# Patient Record
Sex: MALE | Race: WHITE | NOT HISPANIC OR LATINO | Employment: FULL TIME | URBAN - METROPOLITAN AREA
[De-identification: names, ages, dates, MRNs, and addresses within clinical notes are randomized per-mention and may not be internally consistent; named-entity substitution may affect disease eponyms.]

---

## 2019-03-14 ENCOUNTER — OFFICE VISIT (OUTPATIENT)
Dept: FAMILY MEDICINE | Facility: CLINIC | Age: 44
End: 2019-03-14
Payer: COMMERCIAL

## 2019-03-14 VITALS
OXYGEN SATURATION: 97 % | RESPIRATION RATE: 16 BRPM | WEIGHT: 254.75 LBS | BODY MASS INDEX: 33.76 KG/M2 | SYSTOLIC BLOOD PRESSURE: 130 MMHG | HEIGHT: 73 IN | TEMPERATURE: 97.9 F | DIASTOLIC BLOOD PRESSURE: 76 MMHG | HEART RATE: 67 BPM

## 2019-03-14 DIAGNOSIS — Z72.51 HIGH RISK SEXUAL BEHAVIOR, UNSPECIFIED TYPE: ICD-10-CM

## 2019-03-14 DIAGNOSIS — B07.0 PLANTAR WARTS: ICD-10-CM

## 2019-03-14 DIAGNOSIS — G56.23 ULNAR NEUROPATHY OF BOTH UPPER EXTREMITIES: Primary | ICD-10-CM

## 2019-03-14 SDOH — HEALTH STABILITY: MENTAL HEALTH: HOW OFTEN DO YOU HAVE A DRINK CONTAINING ALCOHOL?: 2-3 TIMES A WEEK

## 2019-03-14 SDOH — HEALTH STABILITY: MENTAL HEALTH: HOW OFTEN DO YOU HAVE 6 OR MORE DRINKS ON ONE OCCASION?: MONTHLY

## 2019-03-14 SDOH — HEALTH STABILITY: MENTAL HEALTH: HOW MANY STANDARD DRINKS CONTAINING ALCOHOL DO YOU HAVE ON A TYPICAL DAY?: 1 OR 2

## 2019-03-14 ASSESSMENT — MIFFLIN-ST. JEOR: SCORE: 2099.42

## 2019-03-14 NOTE — PROGRESS NOTES
"  SUBJECTIVE:   Fede Hall is a 43 year old male who presents to clinic today to establish care and to discuss the following problem(s).    He is re-establishing care. Last visit was in 07/2014.    # Paresthesias in Upper Extremities  - noticed first when doing plank during yoga  - pain in left wrist  - started doing wrist exercises and pain improved    - then started noticing problem when he would wake from sleep with \"numbness\" in hands, like it has fallen asleep  - tingling sensationa  - can be both hands, indicates ulnar half of palm and 4th and 5th fingers as area of tingling, can also be in ulnar side of forearms  - went to chiropractor in December and tried acupuncture, not a lot of improvement  - symptoms almost every night  - sometimes happens also when typing  - also sometimes pain in base of thumb of left hand  - no weakness     # Wart  - has been a recurrent issue  - this one present for 1 month on left foot  - has tried OTC liquid treatment  - has been painful    # At risk HIV  - >5 male partners in past 6 months  - condoms usually but not always  - history of rectal GC in December  - mix of receptive and penetrative anal sex    ROS: Denies fevers, chills, chest pain, difficulty breathing, abdominal pain    History reviewed. No pertinent past medical history.  Past Surgical History:   Procedure Laterality Date     TONSILLECTOMY       Family History   Problem Relation Age of Onset     Thyroid Disease Mother         thyroid cancer     Pancreatic Cancer Maternal Grandmother 86     Colon Cancer Maternal Grandfather 66     Heart Disease No family hx of      Prostate Cancer No family hx of      Social History     Tobacco Use     Smoking status: Never Smoker     Smokeless tobacco: Never Used   Substance Use Topics     Alcohol use: Yes     Frequency: 2-3 times a week     Drinks per session: 1 or 2     Binge frequency: Monthly     Drug use: Yes     Types: Marijuana     Comment: rarely MJ     Social " "History     Social History Narrative        Also in [employer] for     Lives alone       Current Outpatient Medications   Medication     Multiple Vitamin (MULTI-DAY VITAMINS PO)     No current facility-administered medications for this visit.      I have reviewed the patient's past medical, surgical, family, and social history.     OBJECTIVE:   /76 (BP Location: Right arm, Patient Position: Sitting, Cuff Size: Adult Large)   Pulse 67   Temp 97.9  F (36.6  C) (Oral)   Resp 16   Ht 1.854 m (6' 1\")   Wt 115.6 kg (254 lb 12 oz)   SpO2 97%   BMI 33.61 kg/m      Constitutional: well-appearing, appears stated age  Eyes: conjunctivae without erythema, sclera anicteric.   Cardiac: regular rate and rhythm, normal S1/S2, no murmur/rubs/gallops  Respiratory: lungs clear to auscultation bilaterally, normal work of breathing, no wheezes/crackles  Skin: approx 0.5cm slightly raised firm papule with dark purple center on sole of left foot  Psych: affect is full and appropriate, speech is fluent and non-pressured  Extremities: positive Tinel at elbow bilateral. No muscle wasting in hands.  strength intact. Intrinsic hand muscle strength intact. Wrist strength 5/5 and symmetric with flexion/extension. Sensation intact in arms to fine touch.     ASSESSMENT AND PLAN:     (G56.23) Ulnar neuropathy of both upper extremities  (primary encounter diagnosis)  Comment: Symptoms and exam consistent with ulnar neuropathy at the elbow. Symptoms not constant, primarily nocturnal. No weakness or muscle wasting. Advised trial of night splinting and avoiding pressure.   Plan:     (B07.0) Plantar warts  Comment: Discussed risks and benefits of cryotherapy including pain, blistering, skin lightning, as well as alternative therapies such as topical medications. Patient preferred cryotherapy which was administered to the wart today after paring down overlying callous with scalpel, 3 freeze-thaw cycles. " Asked him to return if wart persists for additional treatments.   Plan: DESTRUCT BENIGN LESION, UP TO 14          (Z72.51) High risk sexual behavior, unspecified type  Comment: Started discussion of Truvada for PrEP today. I have asked him to return for another visit to discuss further and for routine STI screening.   Plan:     Vinny Ledesma   [facility]  03/14/2019, 12:11 PM

## 2019-03-14 NOTE — NURSING NOTE
"43 year old  Chief Complaint   Patient presents with     Establish Care     Hand Problem     Bilateral numbness when sleeping and when doing yoga     Wart     on left leg       Blood pressure 130/76, pulse 67, temperature 97.9  F (36.6  C), temperature source Oral, resp. rate 16, height 1.854 m (6' 1\"), weight 115.6 kg (254 lb 12 oz), SpO2 97 %. Body mass index is 33.61 kg/m .  Patient Active Problem List   Diagnosis     Viral warts       Wt Readings from Last 2 Encounters:   03/14/19 115.6 kg (254 lb 12 oz)   07/02/14 107 kg (236 lb)     BP Readings from Last 3 Encounters:   03/14/19 130/76   07/02/14 114/77   01/16/13 139/71         Current Outpatient Medications   Medication     Multiple Vitamin (MULTI-DAY VITAMINS PO)     No current facility-administered medications for this visit.        Social History     Tobacco Use     Smoking status: Never Smoker     Smokeless tobacco: Never Used   Substance Use Topics     Alcohol use: Yes     Comment: 7 drinks weekly     Drug use: No       Health Maintenance Due   Topic Date Due     PREVENTIVE CARE VISIT  1974     PHQ-2 Q1 YR  06/22/1986     HIV SCREEN (SYSTEM ASSIGNED)  06/22/1992     DTAP/TDAP/TD IMMUNIZATION (7 - Td) 02/25/2007     LIPID SCREEN Q5 YR MALE (SYSTEM ASSIGNED)  02/17/2016     INFLUENZA VACCINE (1) 09/01/2018       No results found for: PAP      March 14, 2019 9:30 AM    "

## 2020-11-30 ENCOUNTER — OFFICE VISIT (OUTPATIENT)
Dept: URGENT CARE | Facility: URGENT CARE | Age: 45
End: 2020-11-30
Payer: COMMERCIAL

## 2020-11-30 ENCOUNTER — NURSE TRIAGE (OUTPATIENT)
Dept: FAMILY MEDICINE | Facility: CLINIC | Age: 45
End: 2020-11-30

## 2020-11-30 VITALS
BODY MASS INDEX: 29.03 KG/M2 | WEIGHT: 220 LBS | SYSTOLIC BLOOD PRESSURE: 123 MMHG | DIASTOLIC BLOOD PRESSURE: 72 MMHG | TEMPERATURE: 99.5 F | OXYGEN SATURATION: 96 % | HEART RATE: 82 BPM

## 2020-11-30 DIAGNOSIS — R00.2 PALPITATIONS: ICD-10-CM

## 2020-11-30 DIAGNOSIS — R07.9 ACUTE CHEST PAIN: Primary | ICD-10-CM

## 2020-11-30 LAB
ANION GAP SERPL CALCULATED.3IONS-SCNC: 3 MMOL/L (ref 3–14)
BASOPHILS # BLD AUTO: 0 10E9/L (ref 0–0.2)
BASOPHILS NFR BLD AUTO: 0 %
BUN SERPL-MCNC: 13 MG/DL (ref 7–30)
CALCIUM SERPL-MCNC: 9.9 MG/DL (ref 8.5–10.1)
CHLORIDE SERPL-SCNC: 106 MMOL/L (ref 94–109)
CO2 SERPL-SCNC: 32 MMOL/L (ref 20–32)
CREAT SERPL-MCNC: 1.4 MG/DL (ref 0.66–1.25)
DIFFERENTIAL METHOD BLD: NORMAL
EOSINOPHIL # BLD AUTO: 0 10E9/L (ref 0–0.7)
EOSINOPHIL NFR BLD AUTO: 0.3 %
ERYTHROCYTE [DISTWIDTH] IN BLOOD BY AUTOMATED COUNT: 12.7 % (ref 10–15)
GFR SERPL CREATININE-BSD FRML MDRD: 59 ML/MIN/{1.73_M2}
GLUCOSE SERPL-MCNC: 101 MG/DL (ref 70–99)
HCT VFR BLD AUTO: 44.5 % (ref 40–53)
HGB BLD-MCNC: 15.1 G/DL (ref 13.3–17.7)
LYMPHOCYTES # BLD AUTO: 3.4 10E9/L (ref 0.8–5.3)
LYMPHOCYTES NFR BLD AUTO: 38.1 %
MCH RBC QN AUTO: 29.8 PG (ref 26.5–33)
MCHC RBC AUTO-ENTMCNC: 33.9 G/DL (ref 31.5–36.5)
MCV RBC AUTO: 88 FL (ref 78–100)
MONOCYTES # BLD AUTO: 0.5 10E9/L (ref 0–1.3)
MONOCYTES NFR BLD AUTO: 5.7 %
NEUTROPHILS # BLD AUTO: 5 10E9/L (ref 1.6–8.3)
NEUTROPHILS NFR BLD AUTO: 55.9 %
PLATELET # BLD AUTO: 222 10E9/L (ref 150–450)
POTASSIUM SERPL-SCNC: 4.2 MMOL/L (ref 3.4–5.3)
RBC # BLD AUTO: 5.06 10E12/L (ref 4.4–5.9)
SODIUM SERPL-SCNC: 141 MMOL/L (ref 133–144)
WBC # BLD AUTO: 8.9 10E9/L (ref 4–11)

## 2020-11-30 PROCEDURE — 84443 ASSAY THYROID STIM HORMONE: CPT | Performed by: PHYSICIAN ASSISTANT

## 2020-11-30 PROCEDURE — 93000 ELECTROCARDIOGRAM COMPLETE: CPT | Performed by: PHYSICIAN ASSISTANT

## 2020-11-30 PROCEDURE — 80048 BASIC METABOLIC PNL TOTAL CA: CPT | Performed by: PHYSICIAN ASSISTANT

## 2020-11-30 PROCEDURE — 36415 COLL VENOUS BLD VENIPUNCTURE: CPT | Performed by: PHYSICIAN ASSISTANT

## 2020-11-30 PROCEDURE — 85025 COMPLETE CBC W/AUTO DIFF WBC: CPT | Performed by: PHYSICIAN ASSISTANT

## 2020-11-30 PROCEDURE — 99214 OFFICE O/P EST MOD 30 MIN: CPT | Performed by: PHYSICIAN ASSISTANT

## 2020-11-30 NOTE — TELEPHONE ENCOUNTER
Patient with dx of COVID-19, diagnosed on 11/18/20. At that time he had a fever, fatigue and then those symptoms resolved. Since that time he c/o mild chest pressure and heart rate that bounces around between . He denies dizziness, SOB/difficulty breathing, confusion. He does not have skipped/extra beats and really only worried about the heart rate. He is moving around and doing usual activities during this time of isolating due to his dx. He also wonders if his problems are related to feeling anxious about having COVID.    Reason for Disposition    Chest pain or pressure    Protocols used: CORONAVIRUS (COVID-19) DIAGNOSED OR [id number] 8.4.20    Offered a virtual visit with PCP today at 4:00pm. Patient declined this visit at this time, he wants to discuss with a friend first. If he does want this appointment, he will call back to schedule.     Call Center - if pt calls back, schedule with Dr. Ledesma for a Telephone Visit.    Marzena Coughlin RN  11/30/20  3:02 PM

## 2020-12-01 LAB — TSH SERPL DL<=0.005 MIU/L-ACNC: 1.3 MU/L (ref 0.4–4)

## 2020-12-01 NOTE — PROGRESS NOTES
SUBJECTIVE:  Fede Hall is a 45 year old male who presents to the office with the CC of chest pain.  He was diagnosed with COVID 11-18-20.  Overall did very well.  Had fevers, fatigue and some cough and cold sx.  Those sx have resolved but has some mild chest pressure and heart rate that fluctuates.  Denies any dizziness, SOB, difficulty breathing or confusion.  Does not feel that chest flutters or heart skips beats.  He is just worried about the rate variability.   He was very anxious about having COVID and any possible long term risk or complications he may have from it.  Wants  To have his chest checked out  Cardiac risk factors: negative for abnormal lipids, DM, HTN, tobacco     PMH   Generally healthy and no underlying medical issues    FH  Father he believes had A-fib  No MI hx  Hx of mini stroke in father    Current Outpatient Medications:      Multiple Vitamin (MULTI-DAY VITAMINS PO), Take 1 tablet by mouth every 24 hours, Disp: , Rfl:     Social History     Tobacco Use     Smoking status: Never Smoker     Smokeless tobacco: Never Used   Substance Use Topics     Alcohol use: Yes     Frequency: 2-3 times a week     Drinks per session: 1 or 2     Binge frequency: Monthly       ROS:Review of systems negative except as stated above.    EXAM:  /72   Pulse 82   Temp 99.5  F (37.5  C) (Tympanic)   Wt 99.8 kg (220 lb)   SpO2 96%   BMI 29.03 kg/m    GENERAL APPEARANCE: healthy, alert and no distress  EYES: EOMI,  PERRL, conjunctiva clear  HENT: ear canals and TM's normal.  Nose and mouth without ulcers, erythema or lesions  NECK: supple, nontender, no lymphadenopathy  RESP: lungs clear to auscultation - no rales, rhonchi or wheezes  CV: regular rates and rhythm, normal S1 S2, no murmur noted  ABDOMEN:  soft, nontender, no HSM or masses and bowel sounds normal  NEURO: Normal strength and tone, sensory exam grossly normal,  normal speech and mentation  SKIN: no suspicious lesions or rashes     Office  EKG demonstrates: sinus rhythm with 2 ventricular ectopic beats. No ST elevation or change in T waves.  ON call cardiologist called to review EKG and confirms that no concerns with EKG based on hx     Results for orders placed or performed in visit on 11/30/20   CBC with platelets and differential     Status: None   Result Value Ref Range    WBC 8.9 4.0 - 11.0 10e9/L    RBC Count 5.06 4.4 - 5.9 10e12/L    Hemoglobin 15.1 13.3 - 17.7 g/dL    Hematocrit 44.5 40.0 - 53.0 %    MCV 88 78 - 100 fl    MCH 29.8 26.5 - 33.0 pg    MCHC 33.9 31.5 - 36.5 g/dL    RDW 12.7 10.0 - 15.0 %    Platelet Count 222 150 - 450 10e9/L    Diff Method Automated Method     % Neutrophils 55.9 %    % Lymphocytes 38.1 %    % Monocytes 5.7 %    % Eosinophils 0.3 %    % Basophils 0.0 %    Absolute Neutrophil 5.0 1.6 - 8.3 10e9/L    Absolute Lymphocytes 3.4 0.8 - 5.3 10e9/L    Absolute Monocytes 0.5 0.0 - 1.3 10e9/L    Absolute Eosinophils 0.0 0.0 - 0.7 10e9/L    Absolute Basophils 0.0 0.0 - 0.2 10e9/L   Basic metabolic panel  (Ca, Cl, CO2, Creat, Gluc, K, Na, BUN)     Status: Abnormal   Result Value Ref Range    Sodium 141 133 - 144 mmol/L    Potassium 4.2 3.4 - 5.3 mmol/L    Chloride 106 94 - 109 mmol/L    Carbon Dioxide 32 20 - 32 mmol/L    Anion Gap 3 3 - 14 mmol/L    Glucose 101 (H) 70 - 99 mg/dL    Urea Nitrogen 13 7 - 30 mg/dL    Creatinine 1.40 (H) 0.66 - 1.25 mg/dL    GFR Estimate 59 (L) >60 mL/min/[1.73_m2]    GFR Estimate If Black 68 >60 mL/min/[1.73_m2]    Calcium 9.9 8.5 - 10.1 mg/dL   TSH with free T4 reflex     Status: None   Result Value Ref Range    TSH 1.30 0.40 - 4.00 mU/L        assessment/plan:  (R07.9) Acute chest pain  (primary encounter diagnosis)  Comment:   Plan: EKG 12-lead complete w/read - Clinics          EKG and labs reassuring.  Patient appears well and sx appear to be more related to anxiety from recent COVID diagnosis.  On call Cardiologist was called and confirmed that no red flag sign, concern on EKG and no  troponin needed.  Anxiety management discussed. Red flag signs discussed and supportive care.  Follow-up with PCP as needed if sx worsen or new sx develop.  Patient notes understanding and agrees with plan    (R00.2) Palpitations  Comment:   Plan: CBC with platelets and differential, Basic         metabolic panel  (Ca, Cl, CO2, Creat, Gluc, K,         Na, BUN), TSH with free T4 reflex         As above

## 2020-12-06 ENCOUNTER — HEALTH MAINTENANCE LETTER (OUTPATIENT)
Age: 45
End: 2020-12-06

## 2021-07-09 ENCOUNTER — TRANSCRIBE ORDERS (OUTPATIENT)
Dept: OTHER | Age: 46
End: 2021-07-09

## 2021-07-09 DIAGNOSIS — Z86.16 HISTORY OF 2019 NOVEL CORONAVIRUS DISEASE (COVID-19): Primary | ICD-10-CM

## 2021-08-11 ENCOUNTER — VIRTUAL VISIT (OUTPATIENT)
Dept: PHYSICAL MEDICINE AND REHAB | Facility: CLINIC | Age: 46
End: 2021-08-11
Attending: FAMILY MEDICINE
Payer: COMMERCIAL

## 2021-08-11 DIAGNOSIS — R20.0 NUMBNESS AND TINGLING: ICD-10-CM

## 2021-08-11 DIAGNOSIS — R20.2 NUMBNESS AND TINGLING: ICD-10-CM

## 2021-08-11 DIAGNOSIS — R68.82 DECREASED LIBIDO: ICD-10-CM

## 2021-08-11 DIAGNOSIS — Z86.16 HISTORY OF COVID-19: Primary | ICD-10-CM

## 2021-08-11 PROCEDURE — 99205 OFFICE O/P NEW HI 60 MIN: CPT | Mod: 95 | Performed by: STUDENT IN AN ORGANIZED HEALTH CARE EDUCATION/TRAINING PROGRAM

## 2021-08-11 SDOH — SOCIAL STABILITY: SOCIAL NETWORK: I HAVE TROUBLE DOING ALL OF MY REGULAR LEISURE ACTIVITIES WITH OTHERS: NEVER

## 2021-08-11 SDOH — SOCIAL STABILITY: SOCIAL NETWORK: I HAVE TROUBLE DOING ALL OF THE FAMILY ACTIVITIES THAT I WANT TO DO: NEVER

## 2021-08-11 SDOH — SOCIAL STABILITY: SOCIAL NETWORK: PROMIS ABILITY TO PARTICIPATE IN SOCIAL ROLES & ACTIVITIES T-SCORE: 64.1

## 2021-08-11 SDOH — SOCIAL STABILITY: SOCIAL NETWORK: I HAVE TROUBLE DOING ALL OF MY USUAL WORK (INCLUDE WORK AT HOME): NEVER

## 2021-08-11 SDOH — SOCIAL STABILITY: SOCIAL NETWORK: I HAVE TROUBLE DOING ALL OF THE ACTIVITIES WITH FRIENDS THAT I WANT TO DO: NEVER

## 2021-08-11 ASSESSMENT — ENCOUNTER SYMPTOMS
BLOATING: 0
EYE REDNESS: 0
BOWEL INCONTINENCE: 0
RESPIRATORY PAIN: 0
EYE IRRITATION: 0
EYE WATERING: 0
NAUSEA: 0
BLOOD IN STOOL: 0
POSTURAL DYSPNEA: 0
HYPERTENSION: 0
NIGHT SWEATS: 0
CONSTIPATION: 0
FLANK PAIN: 0
HALLUCINATIONS: 0
LIGHT-HEADEDNESS: 0
NAIL CHANGES: 0
COUGH DISTURBING SLEEP: 0
SWOLLEN GLANDS: 0
EYE PAIN: 0
LEG PAIN: 0
CHILLS: 0
FEVER: 0
DIARRHEA: 0
JAUNDICE: 0
DECREASED APPETITE: 0
PALPITATIONS: 0
BRUISES/BLEEDS EASILY: 0
SLEEP DISTURBANCES DUE TO BREATHING: 0
FATIGUE: 0
DIFFICULTY URINATING: 0
DECREASED CONCENTRATION: 0
CLAUDICATION: 0
ARTHRALGIAS: 1
ABDOMINAL PAIN: 0
SNORES LOUDLY: 0
HYPOTENSION: 0
POLYDIPSIA: 0
WEIGHT GAIN: 0
WHEEZING: 0
SYNCOPE: 0
DEPRESSION: 0
LEG SWELLING: 0
SKIN CHANGES: 0
HEMATURIA: 0
TINGLING: 1
RECTAL BLEEDING: 0
POOR WOUND HEALING: 0
INCREASED ENERGY: 0
SHORTNESS OF BREATH: 0
DYSPNEA ON EXERTION: 0
TACHYCARDIA: 0
WEIGHT LOSS: 0
POLYPHAGIA: 0
ORTHOPNEA: 0
PANIC: 0
DOUBLE VISION: 0
ALTERED TEMPERATURE REGULATION: 0
EXERCISE INTOLERANCE: 0
DYSURIA: 0
HEARTBURN: 0
HEADACHES: 1
VOMITING: 0
HEMOPTYSIS: 0
SPUTUM PRODUCTION: 0
COUGH: 0
RECTAL PAIN: 0

## 2021-08-11 ASSESSMENT — PATIENT HEALTH QUESTIONNAIRE - PHQ9
10. IF YOU CHECKED OFF ANY PROBLEMS, HOW DIFFICULT HAVE THESE PROBLEMS MADE IT FOR YOU TO DO YOUR WORK, TAKE CARE OF THINGS AT HOME, OR GET ALONG WITH OTHER PEOPLE: NOT DIFFICULT AT ALL
SUM OF ALL RESPONSES TO PHQ QUESTIONS 1-9: 0
SUM OF ALL RESPONSES TO PHQ QUESTIONS 1-9: 0

## 2021-08-11 ASSESSMENT — ANXIETY QUESTIONNAIRES
7. FEELING AFRAID AS IF SOMETHING AWFUL MIGHT HAPPEN: NOT AT ALL
6. BECOMING EASILY ANNOYED OR IRRITABLE: NOT AT ALL
GAD7 TOTAL SCORE: 0
2. NOT BEING ABLE TO STOP OR CONTROL WORRYING: NOT AT ALL
GAD7 TOTAL SCORE: 0
3. WORRYING TOO MUCH ABOUT DIFFERENT THINGS: NOT AT ALL
8. IF YOU CHECKED OFF ANY PROBLEMS, HOW DIFFICULT HAVE THESE MADE IT FOR YOU TO DO YOUR WORK, TAKE CARE OF THINGS AT HOME, OR GET ALONG WITH OTHER PEOPLE?: NOT DIFFICULT AT ALL
5. BEING SO RESTLESS THAT IT IS HARD TO SIT STILL: NOT AT ALL
4. TROUBLE RELAXING: NOT AT ALL
1. FEELING NERVOUS, ANXIOUS, OR ON EDGE: NOT AT ALL
7. FEELING AFRAID AS IF SOMETHING AWFUL MIGHT HAPPEN: NOT AT ALL
GAD7 TOTAL SCORE: 0

## 2021-08-11 NOTE — PROGRESS NOTES
Fede is a 46 year old who is being evaluated via a billable video visit.      How would you like to obtain your AVS? MyChart  If the video visit is dropped, the invitation should be resent by: Text to cell phone: [phone]  Will anyone else be joining your video visit? No      Video Start Time: 2.05 pm  Video-Visit Details    Type of service:  Video Visit    Video End Time:2:48 PM    Originating Location (pt. Location): Home    Distant Location (provider location):  [facility] [place]     Platform used for Video Visit: [employer]     Total time including chart review, care-coordination and documentation time on the date of encounter - 67 mins          [facility]  Post COVID Clinic    Today's Date: 08/11/2021     Recommendations:  Recheck creatinine as elevated in 11/2020, check HBsAb to see if hepatitis B vaccination worked  Calming and relaxing activities like meditation everyday   Distracting the mind and not focusing on symptoms  Due to high number of sexual partners, encouraged condom use at all times and to seek medical attention if a condom malfunctions or he suspects HIV positivity of a partner.   Up to date with vaccinations     Patient will contact me if he is not better in 1-2 months    Thank you for involving me in the care of this patient.     Anastasia Suarez MD      Assessment:  Fede Hall is a 46 year old with no major PMH, primary syphilis in 2019 (s/p 1 PCN shot)     Post COVID symptoms: Diagnosed with COVID Nov 2020. Current symptoms include numbness/tingling behind left ear, libido is not as good as before. This was affecting his quality of life and work previously but not anymore.    Reassured patient that these symptoms are not uncommon after covid. Does not need to see a neurologist now. Can try a few things and see if that works first.    -------------------------------------------------------------------------------------------------------------------    Reason for Consult / Chief complaint:   Consulted by Dr. Terry for post COVID symptoms    History of Presenting Illness:  Fede Hall is a 46 year old with no major PMH, primary syphilis in 2019 (s/p 1 PCN shot)     History of COVID-19 infection:  COVID test and Date: Nov 2020, [facility], saliva test   Symptoms: fever low grade, upset stomach, tingling in hands/toes, cramping, loss of sleep - woke up at 2 am and could not go to sleep, anxieties, blaming himself since he saw parents two days later but thankfully did not develop COVID, loss of libido, higher pulse on getting up, chest pain, left ear would pain with wearing mask for longer time, Blurred vision sometimes,  Pain/tingly sensation proximal to wrist, Hard to focus, Pain/tingly sensation proximal to wrist, One day burning skin all over the body - took gabapentin for a week   Hospitalization:no, went to ER and was told he had premature heart beats   Treatment: no    Heart beats got better in a month   Started sleeping better in a couple of months  Blurred vision- not in the past 2 months    Current symptoms:  Almost all the symptoms have resolved except a couple below.     Weird sensation - numbness, tingling behind left ear, notices it when he wears a mask usually but seems to be present at other times too  Libido got better but not great sometimes   Started taking alpha lipoeic acid to help with nerve symptoms. He wonders if he needs to see a neurologist.       Work situation:   Works as an      Assessment tools:    PHQ 8/11/2021   PHQ-9 Total Score 0   Q9: Thoughts of better off dead/self-harm past 2 weeks Not at all       MARY KATE-7 SCORE 8/11/2021   Total Score 0 (minimal anxiety)   Total Score 0       Cut points of 5, 10, and 15 might be interpreted as representing mild, moderate, and severe levels of anxiety on the  MARY KATE-7, similar to levels of depression on the PHQ-9.      PROMIS-29 8/11/2021   PROMIS Physical Function T-Score 56.9 (within normal limits)   PROMIS Anxiety T-Score 40.3 (within normal limits)   PROMIS Depression T-Score 41 (within normal limits)   PROMIS Fatigue T-Score 39.7 (within normal limits)   PROMIS Sleep Disturbance T-Score 32 (within normal limits)   PROMIS Ability to Participate in Social Roles & Activities T-Score 64.1 (within normal limits)   PROMIS Pain Interference T-Score 52 (within normal limits)   PROMIS Pain Intensity 3     Work Productivity and Activity Impairment Questionnaire: General Health V2.0 8/11/2021   Are you currently employed (working for pay)? Yes   During the past seven days, how many hours did you miss from work because of your health problems?  0   During the past seven days, how many hours did you actually work? 40   During the past seven days, how much did your health problems affect your productivity while you were working? 1   During the past seven days, how much did your health problems affect your ability to do your regular activities, other than work at a job? 0       Current Concerns:  Health - yes   Other - no       Social Hx:  Social History     Tobacco Use     Smoking status: Never Smoker     Smokeless tobacco: Never Used   Substance Use Topics     Alcohol use: Yes     Drug use: Yes     Types: Marijuana     Comment: rarely MJ   he is from [place], Lives alone   Sexually active - 4-5 partners, Bisexual, currently only men, always uses condoms, patient asked about truvada     Immunizations:  Immunization History   Administered Date(s) Administered     COVID-19,PF,Moderna 06/03/2021, 07/01/2021     DTAP (<7y) 04/15/1975, 05/15/1975, 03/20/1981, 04/18/1986, 04/15/1991     FLU 6-35 months 01/11/2010, 04/03/2013     HEPA 04/27/2005, 08/09/2012     HepA-Adult 01/11/2010     HepB 04/27/2005, 06/14/2005, 01/26/2006     Historical DTP/aP 04/15/1975, 05/15/1975, 03/20/1981,  04/18/1986, 04/15/1991     Influenza (H1N1) 01/11/2010     Influenza (IIV3) PF 12/20/2018     MMR 09/01/1992, 04/03/2013     OPV, trivalent, live 1974, 1974, 04/15/1975, 11/05/1975, 03/26/1976     Poliovirus, inactivated (IPV) 1974, 1974, 04/15/1975, 03/26/1976, 09/02/1981, 01/11/2010     TDAP Vaccine (Adacel) 01/11/2010     Tdap (Adacel,Boostrix) 02/25/1997     Typhoid IM 01/11/2010, 04/03/2013     Varicella 04/03/2013   Tdap 7/2021     Allergies:   Allergies   Allergen Reactions     Cats          Medications:  Current Outpatient Medications   Medication Sig Dispense Refill     Multiple Vitamin (MULTI-DAY VITAMINS PO) Take 1 tablet by mouth every 24 hours         Family History:  Family History   Problem Relation Age of Onset     Thyroid Disease Mother         thyroid cancer     Pancreatic Cancer Maternal Grandmother 86     Colon Cancer Maternal Grandfather 66     Heart Disease No family hx of      Prostate Cancer No family hx of        Review of Systems     Constitutional:  Negative for fever, chills, weight loss, weight gain, fatigue, decreased appetite, night sweats, recent stressors, height gain, height loss, post-operative complications, incisional pain, hallucinations, increased energy, hyperactivity and confused.   HENT:  Positive for ear pain.    Eyes:  Negative for double vision, pain, redness, eye pain, decreased vision, eye watering, eye bulging, eye dryness, flashing lights, spots, floaters, strabismus, tunnel vision, jaundice and eye irritation.   Respiratory:   Negative for cough, hemoptysis, sputum production, shortness of breath, wheezing, sleep disturbances due to breathing, snores loudly, respiratory pain, dyspnea on exertion, cough disturbing sleep and postural dyspnea.    Cardiovascular:  Negative for chest pain, dyspnea on exertion, palpitations, orthopnea, claudication, leg swelling, fingers/toes turn blue, hypertension, hypotension, syncope, history of heart murmur,  chest pain on exertion, chest pain at rest, pacemaker, few scattered varicosities, leg pain, sleep disturbances due to breathing, tachycardia, light-headedness, exercise intolerance and edema.   Gastrointestinal:  Negative for heartburn, nausea, vomiting, abdominal pain, diarrhea, constipation, blood in stool, melena, rectal pain, bloating, hemorrhoids, bowel incontinence, jaundice, rectal bleeding, coffee ground emesis and change in stool.   Genitourinary:  Positive for male genitourinary complaint and reduced libido. Negative for bladder incontinence, dysuria, urgency, hematuria, flank pain, difficulty urinating, nocturia and voiding less frequently.   Musculoskeletal:  Positive for arthralgias.   Skin:  Negative for nail changes, itching, poor wound healing, rash, hair changes, skin changes, acne, warts, poor wound healing, scarring, flaky skin, Raynaud's phenomenon, sensitivity to sunlight and skin thickening.   Neurological:  Positive for tingling and headaches. Negative for light-headedness.   Endo/Heme:  Negative for anemia, swollen glands and bruises/bleeds easily.   Psychiatric/Behavioral:  Negative for depression, hallucinations, decreased concentration, mood swings and panic attacks.    Endocrine:  Negative for altered temperature regulation, polyphagia, polydipsia, unwanted hair growth and change in facial hair.      Examination:  Unable to fully examine due to virtual visit     Laboratory:  Hematology:  Recent Labs   Lab Test 11/30/20 1854   WBC 8.9   ANEU 5.0   ALYM 3.4   BRUNA 0.5   AEOS 0.0   HGB 15.1   HCT 44.5          Chemistry:  Recent Labs   Lab Test 11/30/20 1854      POTASSIUM 4.2   CHLORIDE 106   CO2 32   ANIONGAP 3   BUN 13   CR 1.40*   GFRESTIMATED 59*   *   WILBUR 9.9       Liver Function Studies:  No lab results found.    Invalid input(s): LD    COVID-19 PCR Results    COVID-19 PCR Results   No data to display.         COVID-19 Antibody Results, Testing for Immunity     COVID-19 Antibody Results, Testing for Immunity   No data to display.            Imaging:  none

## 2021-08-11 NOTE — LETTER
8/11/2021       RE: Fede Hall  [address]  [facility] [address]     Dear Colleague,    Thank you for referring your patient, Fede Hall, to the [facility] PHYSICAL MEDICINE AND REHABILITATION CLINIC [place] at [facility]. Please see a copy of my visit note below.    Total time including chart review, care-coordination and documentation time on the date of encounter - 67 mins    [facility]  Post COVID Clinic    Today's Date: 08/11/2021     Recommendations:  Recheck creatinine as elevated in 11/2020, check HBsAb to see if hepatitis B vaccination worked  Calming and relaxing activities like meditation everyday   Distracting the mind and not focusing on symptoms  Due to high number of sexual partners, encouraged condom use at all times and to seek medical attention if a condom malfunctions or he suspects HIV positivity of a partner.   Up to date with vaccinations     Patient will contact me if he is not better in 1-2 months    Thank you for involving me in the care of this patient.     Anastasia Suarez MD      Assessment:  Fede Hall is a 46 year old with no major PMH, primary syphilis in 2019 (s/p 1 PCN shot)     Post COVID symptoms: Diagnosed with COVID Nov 2020. Current symptoms include numbness/tingling behind left ear, libido is not as good as before. This was affecting his quality of life and work previously but not anymore.    Reassured patient that these symptoms are not uncommon after covid. Does not need to see a neurologist now. Can try a few things and see if that works first.   -------------------------------------------------------------------------------------------------------------------    Reason for Consult / Chief complaint:   Consulted by Dr. Terry for post COVID symptoms    History of Presenting Illness:  Fede Hall is a 46 year old with no major PMH, primary syphilis in 2019  (s/p 1 PCN shot)     History of COVID-19 infection:  COVID test and Date: Nov 2020, [facility], saliva test   Symptoms: fever low grade, upset stomach, tingling in hands/toes, cramping, loss of sleep - woke up at 2 am and could not go to sleep, anxieties, blaming himself since he saw parents two days later but thankfully did not develop COVID, loss of libido, higher pulse on getting up, chest pain, left ear would pain with wearing mask for longer time, Blurred vision sometimes,  Pain/tingly sensation proximal to wrist, Hard to focus, Pain/tingly sensation proximal to wrist, One day burning skin all over the body - took gabapentin for a week   Hospitalization:no, went to ER and was told he had premature heart beats   Treatment: no    Heart beats got better in a month   Started sleeping better in a couple of months  Blurred vision- not in the past 2 months    Current symptoms:  Almost all the symptoms have resolved except a couple below.     Weird sensation - numbness, tingling behind left ear, notices it when he wears a mask usually but seems to be present at other times too  Libido got better but not great sometimes   Started taking alpha lipoeic acid to help with nerve symptoms. He wonders if he needs to see a neurologist.       Work situation:   Works as an      Assessment tools:    PHQ 8/11/2021   PHQ-9 Total Score 0   Q9: Thoughts of better off dead/self-harm past 2 weeks Not at all       MARY KATE-7 SCORE 8/11/2021   Total Score 0 (minimal anxiety)   Total Score 0       Cut points of 5, 10, and 15 might be interpreted as representing mild, moderate, and severe levels of anxiety on the MARY KATE-7, similar to levels of depression on the PHQ-9.      PROMIS-29 8/11/2021   PROMIS Physical Function T-Score 56.9 (within normal limits)   PROMIS Anxiety T-Score 40.3 (within normal limits)   PROMIS Depression T-Score 41 (within normal limits)   PROMIS Fatigue T-Score 39.7 (within normal limits)   PROMIS Sleep  Disturbance T-Score 32 (within normal limits)   PROMIS Ability to Participate in Social Roles & Activities T-Score 64.1 (within normal limits)   PROMIS Pain Interference T-Score 52 (within normal limits)   PROMIS Pain Intensity 3     Work Productivity and Activity Impairment Questionnaire: General Health V2.0 8/11/2021   Are you currently employed (working for pay)? Yes   During the past seven days, how many hours did you miss from work because of your health problems?  0   During the past seven days, how many hours did you actually work? 40   During the past seven days, how much did your health problems affect your productivity while you were working? 1   During the past seven days, how much did your health problems affect your ability to do your regular activities, other than work at a job? 0       Current Concerns:  Health - yes   Other - no       Social Hx:  Social History     Tobacco Use     Smoking status: Never Smoker     Smokeless tobacco: Never Used   Substance Use Topics     Alcohol use: Yes     Drug use: Yes     Types: Marijuana     Comment: rarely MJ   he is from [place], Lives alone   Sexually active - 4-5 partners, Bisexual, currently only men, always uses condoms, patient asked about truvada     Immunizations:  Immunization History   Administered Date(s) Administered     COVID-19,PF,Moderna 06/03/2021, 07/01/2021     DTAP (<7y) 04/15/1975, 05/15/1975, 03/20/1981, 04/18/1986, 04/15/1991     FLU 6-35 months 01/11/2010, 04/03/2013     HEPA 04/27/2005, 08/09/2012     HepA-Adult 01/11/2010     HepB 04/27/2005, 06/14/2005, 01/26/2006     Historical DTP/aP 04/15/1975, 05/15/1975, 03/20/1981, 04/18/1986, 04/15/1991     Influenza (H1N1) 01/11/2010     Influenza (IIV3) PF 12/20/2018     MMR 09/01/1992, 04/03/2013     OPV, trivalent, live 1974, 1974, 04/15/1975, 11/05/1975, 03/26/1976     Poliovirus, inactivated (IPV) 1974, 1974, 04/15/1975, 03/26/1976, 09/02/1981, 01/11/2010     TDAP  Vaccine (Adacel) 01/11/2010     Tdap (Adacel,Boostrix) 02/25/1997     Typhoid IM 01/11/2010, 04/03/2013     Varicella 04/03/2013   Tdap 7/2021     Allergies:   Allergies   Allergen Reactions     Cats          Medications:  Current Outpatient Medications   Medication Sig Dispense Refill     Multiple Vitamin (MULTI-DAY VITAMINS PO) Take 1 tablet by mouth every 24 hours         Family History:  Family History   Problem Relation Age of Onset     Thyroid Disease Mother         thyroid cancer     Pancreatic Cancer Maternal Grandmother 86     Colon Cancer Maternal Grandfather 66     Heart Disease No family hx of      Prostate Cancer No family hx of        Review of Systems     Constitutional:  Negative for fever, chills, weight loss, weight gain, fatigue, decreased appetite, night sweats, recent stressors, height gain, height loss, post-operative complications, incisional pain, hallucinations, increased energy, hyperactivity and confused.   HENT:  Positive for ear pain.    Eyes:  Negative for double vision, pain, redness, eye pain, decreased vision, eye watering, eye bulging, eye dryness, flashing lights, spots, floaters, strabismus, tunnel vision, jaundice and eye irritation.   Respiratory:   Negative for cough, hemoptysis, sputum production, shortness of breath, wheezing, sleep disturbances due to breathing, snores loudly, respiratory pain, dyspnea on exertion, cough disturbing sleep and postural dyspnea.    Cardiovascular:  Negative for chest pain, dyspnea on exertion, palpitations, orthopnea, claudication, leg swelling, fingers/toes turn blue, hypertension, hypotension, syncope, history of heart murmur, chest pain on exertion, chest pain at rest, pacemaker, few scattered varicosities, leg pain, sleep disturbances due to breathing, tachycardia, light-headedness, exercise intolerance and edema.   Gastrointestinal:  Negative for heartburn, nausea, vomiting, abdominal pain, diarrhea, constipation, blood in stool, melena,  rectal pain, bloating, hemorrhoids, bowel incontinence, jaundice, rectal bleeding, coffee ground emesis and change in stool.   Genitourinary:  Positive for male genitourinary complaint and reduced libido. Negative for bladder incontinence, dysuria, urgency, hematuria, flank pain, difficulty urinating, nocturia and voiding less frequently.   Musculoskeletal:  Positive for arthralgias.   Skin:  Negative for nail changes, itching, poor wound healing, rash, hair changes, skin changes, acne, warts, poor wound healing, scarring, flaky skin, Raynaud's phenomenon, sensitivity to sunlight and skin thickening.   Neurological:  Positive for tingling and headaches. Negative for light-headedness.   Endo/Heme:  Negative for anemia, swollen glands and bruises/bleeds easily.   Psychiatric/Behavioral:  Negative for depression, hallucinations, decreased concentration, mood swings and panic attacks.    Endocrine:  Negative for altered temperature regulation, polyphagia, polydipsia, unwanted hair growth and change in facial hair.      Examination:  Unable to fully examine due to virtual visit     Laboratory:  Hematology:  Recent Labs   Lab Test 11/30/20 1854   WBC 8.9   ANEU 5.0   ALYM 3.4   BRUNA 0.5   AEOS 0.0   HGB 15.1   HCT 44.5          Chemistry:  Recent Labs   Lab Test 11/30/20  1854      POTASSIUM 4.2   CHLORIDE 106   CO2 32   ANIONGAP 3   BUN 13   CR 1.40*   GFRESTIMATED 59*   *   WILBUR 9.9       Liver Function Studies:  No lab results found.    Invalid input(s): LD    COVID-19 PCR Results    COVID-19 PCR Results   No data to display.         COVID-19 Antibody Results, Testing for Immunity    COVID-19 Antibody Results, Testing for Immunity   No data to display.            Imaging:  none        Again, thank you for allowing me to participate in the care of your patient.      Sincerely,    Anastasia Suarez MD

## 2021-08-12 ENCOUNTER — LAB (OUTPATIENT)
Dept: LAB | Facility: CLINIC | Age: 46
End: 2021-08-12
Payer: COMMERCIAL

## 2021-08-12 DIAGNOSIS — Z86.16 HISTORY OF COVID-19: ICD-10-CM

## 2021-08-12 LAB
CREAT SERPL-MCNC: 1.01 MG/DL (ref 0.66–1.25)
GFR SERPL CREATININE-BSD FRML MDRD: 88 ML/MIN/1.73M2
HBV SURFACE AB SERPL IA-ACNC: 0.87 M[IU]/ML

## 2021-08-12 PROCEDURE — 36415 COLL VENOUS BLD VENIPUNCTURE: CPT | Performed by: PATHOLOGY

## 2021-08-12 PROCEDURE — 86706 HEP B SURFACE ANTIBODY: CPT | Mod: 90 | Performed by: PATHOLOGY

## 2021-08-12 PROCEDURE — 82565 ASSAY OF CREATININE: CPT | Performed by: PATHOLOGY

## 2021-08-12 ASSESSMENT — ANXIETY QUESTIONNAIRES: GAD7 TOTAL SCORE: 0

## 2021-08-12 ASSESSMENT — PATIENT HEALTH QUESTIONNAIRE - PHQ9: SUM OF ALL RESPONSES TO PHQ QUESTIONS 1-9: 0

## 2021-09-08 DIAGNOSIS — Z23 ENCOUNTER FOR VACCINATION: ICD-10-CM

## 2021-09-09 ENCOUNTER — ALLIED HEALTH/NURSE VISIT (OUTPATIENT)
Dept: INFECTIOUS DISEASES | Facility: CLINIC | Age: 46
End: 2021-09-09
Attending: STUDENT IN AN ORGANIZED HEALTH CARE EDUCATION/TRAINING PROGRAM
Payer: COMMERCIAL

## 2021-09-09 DIAGNOSIS — Z23 ENCOUNTER FOR VACCINATION: Primary | ICD-10-CM

## 2021-09-09 PROCEDURE — G0010 ADMIN HEPATITIS B VACCINE: HCPCS | Performed by: STUDENT IN AN ORGANIZED HEALTH CARE EDUCATION/TRAINING PROGRAM

## 2021-09-09 PROCEDURE — 90746 HEPB VACCINE 3 DOSE ADULT IM: CPT | Performed by: STUDENT IN AN ORGANIZED HEALTH CARE EDUCATION/TRAINING PROGRAM

## 2021-09-09 PROCEDURE — 250N000011 HC RX IP 250 OP 636: Performed by: STUDENT IN AN ORGANIZED HEALTH CARE EDUCATION/TRAINING PROGRAM

## 2021-09-09 RX ADMIN — HEPATITIS B VACCINE (RECOMBINANT) 40 MCG: 20 INJECTION, SUSPENSION INTRAMUSCULAR at 10:50

## 2021-09-09 NOTE — PROGRESS NOTES
Pt presented for hep B vaccine 40mcg. Pt with higher dose, one syringe adminstered in R deltoid, one in L deltoid. Tolerated well. Pt aware will need another dose in one month, and six months from today. Pt scheduled for one month from today.

## 2021-09-26 ENCOUNTER — HEALTH MAINTENANCE LETTER (OUTPATIENT)
Age: 46
End: 2021-09-26

## 2021-10-05 ENCOUNTER — TELEPHONE (OUTPATIENT)
Dept: INFECTIOUS DISEASES | Facility: CLINIC | Age: 46
End: 2021-10-05

## 2021-10-05 NOTE — TELEPHONE ENCOUNTER
This is OK.  Dose #2 ~ one month after first dose, dose #3 five months after first dose (months 0,1,6.) After the October dose, pt will need appt #3 for February.

## 2021-10-05 NOTE — TELEPHONE ENCOUNTER
[facility] Call Center    Phone Message    May a detailed message be left on voicemail: yes     Reason for Call: Other: Please review the Pt's appt on 10/12/21 for a Hep. B injection. Writer sending for review because Pt was originally scheduled for this on 10/8/21, but the Pt will be out of town. Writer is unsure if new appt date is OK because also in the notes in their chart from their first injection on 9/9/21 it stated to get another dose in one month and six days from that appt. However the 10/8/21 appt wasn't in that time frame either. Please review, and follow up with Pt to reschedule it needed.     Action Taken: Message routed to:  [facility] ([facility]): id    Travel Screening: Not Applicable

## 2021-10-14 ENCOUNTER — ALLIED HEALTH/NURSE VISIT (OUTPATIENT)
Dept: INFECTIOUS DISEASES | Facility: CLINIC | Age: 46
End: 2021-10-14
Payer: COMMERCIAL

## 2021-10-14 DIAGNOSIS — Z23 ENCOUNTER FOR VACCINATION: Primary | ICD-10-CM

## 2021-10-14 PROCEDURE — 90746 HEPB VACCINE 3 DOSE ADULT IM: CPT | Performed by: STUDENT IN AN ORGANIZED HEALTH CARE EDUCATION/TRAINING PROGRAM

## 2021-10-14 PROCEDURE — G0010 ADMIN HEPATITIS B VACCINE: HCPCS | Performed by: STUDENT IN AN ORGANIZED HEALTH CARE EDUCATION/TRAINING PROGRAM

## 2021-10-14 PROCEDURE — 250N000011 HC RX IP 250 OP 636: Performed by: STUDENT IN AN ORGANIZED HEALTH CARE EDUCATION/TRAINING PROGRAM

## 2021-10-14 RX ADMIN — HEPATITIS B VACCINE (RECOMBINANT) 40 MCG: 20 INJECTION, SUSPENSION INTRAMUSCULAR at 11:00

## 2021-10-14 NOTE — PROGRESS NOTES
Patient presented for hep b vaccine #2, high dose. Name  allergies and medications verified. See MAR for details.

## 2022-01-15 ENCOUNTER — HEALTH MAINTENANCE LETTER (OUTPATIENT)
Age: 47
End: 2022-01-15

## 2022-09-01 ENCOUNTER — TELEPHONE (OUTPATIENT)
Dept: INFECTIOUS DISEASES | Facility: CLINIC | Age: 47
End: 2022-09-01

## 2022-09-02 ENCOUNTER — TELEPHONE (OUTPATIENT)
Dept: INFECTIOUS DISEASES | Facility: CLINIC | Age: 47
End: 2022-09-02

## 2023-04-23 ENCOUNTER — HEALTH MAINTENANCE LETTER (OUTPATIENT)
Age: 48
End: 2023-04-23

## 2023-10-09 ENCOUNTER — TELEPHONE (OUTPATIENT)
Dept: FAMILY MEDICINE | Facility: CLINIC | Age: 48
End: 2023-10-09

## 2023-10-10 ENCOUNTER — TELEPHONE (OUTPATIENT)
Dept: FAMILY MEDICINE | Facility: CLINIC | Age: 48
End: 2023-10-10
Payer: COMMERCIAL

## 2023-10-10 NOTE — CONFIDENTIAL NOTE
Telephone Intake--REST  Date: 10/10/2023  Client Name:  Fede     MRN: [id number]  : 1975      Age:  48        Presenting Problem / Reason for Assessment   (Clinical History &Symptoms):       Length of time experiencing symptoms:       Getting and maintaining an erection  Issues with arousal and low libido  Unable to ejaculate (impossible) at times    Symptoms: Since being sick with Covid in 2020      M.D: Urologist in [place] - will try and get records  --If yes, request records from the provider at the 1st session.    Therapist:  Bipin Courtney                    [facility]  --if yes, request a referral or summary letter from the therapist at the 1st session..    Psychiatrist:  NO  --if yes,, request records from the provider at the 1st session..      Other Medical/Mental Health Diagnoses:  None        If needed, clarify if patient calling from group home or other supervised living arrangement  Note if patient has no mental health or cognitive diagnosis, but phone behavior suggests impairment      Medications:  None        Primary Care Provider: [facility]  --If multiple medical conditions, request recent records from primary doctor at the 1st session..      Referral Source:        Follow Up:        Please Verify Registration    If patient has Roombeats or Diaphonics, send to .     Prep Welcome Packet and have patient come half hour beforehand to fill out forms in packet (health history form, transgender history, Safety Screening, PHQ9, and MARY KATE-7.

## 2023-12-04 ENCOUNTER — OFFICE VISIT (OUTPATIENT)
Dept: FAMILY MEDICINE | Facility: CLINIC | Age: 48
End: 2023-12-04
Payer: COMMERCIAL

## 2023-12-04 VITALS
SYSTOLIC BLOOD PRESSURE: 144 MMHG | DIASTOLIC BLOOD PRESSURE: 80 MMHG | BODY MASS INDEX: 30.9 KG/M2 | WEIGHT: 234.2 LBS | HEART RATE: 92 BPM

## 2023-12-04 DIAGNOSIS — N52.9 ERECTILE DYSFUNCTION, UNSPECIFIED ERECTILE DYSFUNCTION TYPE: Primary | ICD-10-CM

## 2023-12-04 DIAGNOSIS — R68.82 LOW LIBIDO: ICD-10-CM

## 2023-12-04 DIAGNOSIS — Z86.16 HISTORY OF COVID-19: ICD-10-CM

## 2023-12-04 DIAGNOSIS — Z77.21 EXPOSURE TO BLOOD OR BODY FLUID: ICD-10-CM

## 2023-12-04 DIAGNOSIS — F41.9 ANXIETY: ICD-10-CM

## 2023-12-04 PROCEDURE — 99205 OFFICE O/P NEW HI 60 MIN: CPT | Performed by: FAMILY MEDICINE

## 2023-12-04 NOTE — PROGRESS NOTES
"Initial evaluation type: Sexual Medicine Evaluation         HPI   ID: 48 year old male       CC: Here for Initial evaluation type: Sexual Medicine Evaluation low libido since 2020 and anorgasmia, lifelong    HPI:  [place] came [place] 17 [place] [facility] country  1st sexually active with partner in his 30's    LIfelong anorgasmia  Patient has no difficulty reaching orgasm, no difficulty with erections with masturbation. With partnered sex, tends to have good arousal and erections until penetrative intercourse begins, then he loses interest and erection fades. He has both male and female partners (cisgender), had this has happened across all partners.   With oral sex, 20% of time has difficulty maintaining erections with partner, and due to this, will not reach orgasm  No difficulty with manual stimulation with partner    Better:   Visual stimulation/erotica  Use of \"poppers\"  AM sex  Patient has no good experience with long relationships    2. Libido change  Prior to experiencing Covid infection 11/2020, interested in sex 2-3x's/day  Covid infection--not hospitalized but acute infection x 9 days, severe fatigue, couldn't work or focus, anxiety, palpitations. He was afraid to walk due to tachycardia. Gained weight. Experiences joint pain, panic attacks. Symptoms persisted to varying degrees, along with reduced libido    He then spent 6 months in [place] at parent's vacation home, had a friend who works with nutritionist do lab tests, noted increase AST, saw 3-4 phyisicians in [place] regarding long Covid. 8/11/2021 Physical Medicine note reviewed, 9/2023 PCP note reviewed  Went to doctor in [place] 5/2021, diagnosed NAFLD on ultrasound, prostatitis, and premature heartbeats. Saw nutritionist, who prescribed change from keto diet to balanced diet with amino acid supplements, MAK-E. AST improved/stablized per patient--no records available. Saw urologist in [place]--was given injection x 1 of testosterone, and course of " "Cialis (no records)  Nutriitionist most recently started trial of yohimbine x 2weeks, which has increased erections but also BP. Along with B-12, Ltheonine, 5-HT, folate and LALO for anxiety and sleep. Nutritionist generally prescribes a limited course of supplement s(1-3 months) and then stops them, rather as chronic interventions.   He is now doing exercise including strength training, total 120 minutes per week.   Non sexual symptoms have improved since instituting diet, activity and supplement changes as above    Notes that libido has improved since diet, exercise and supplement interventions from nutritionist/doctor in [place] as well, though not back to pre Covid baseline    Currently:   Libido: rarely has sexual thoughts or feelings. Desire for sex is \"cyclical\", but not at same level as prior to Covid    Better: Having a relationship, adequate sleep, physical activity  Worse: fear of not being able to perform    No pain with erections, no curve to penis, no recurrence of past urinary symptoms        Current medical conditions:  Patient Active Problem List   Diagnosis    Viral warts    Encounter for vaccination    Elevated cholesterol--controlled with diet, activity, had 1 course of niacin through nutritionist       No Known Allergies     Current medications:  Current Outpatient Medications   Medication    Multiple Vitamin (MULTI-DAY VITAMINS PO)     No current facility-administered medications for this visit.    Fish oil  Alpha lipoic acid  PreP on demand    Past Medical History:  No past medical history on file.   Past Surgical History:   Procedure Laterality Date    TONSILLECTOMY      No hospitalizations      Family History:  Family History   Problem Relation Age of Onset    Thyroid Disease Mother         thyroid cancer    Pancreatic Cancer Maternal Grandmother 86    Colon Cancer Maternal Grandfather 66    Heart Disease No family hx of     Prostate Cancer No family hx of    Mother--thyroid cancer, htn, " lipids  Father--htn  Sibs-well     Social History:  Diet as above  Never smoker  Alcohol--none, prior to lifestyle changes, 6 drinks/week, concentrated on weekends  THC, rare use  Work: [employer]  No children      Sexual History:  Attracted to: bisexual  Currently sexually active: Yes:  within 3 months  Number of partners: in last year 30  History STI's: chlamdyia, syphillis, all treated  Tested for HIV:9/2023  Condoms in past, started on PreP on demand taking 2 hours before sex (2-1-1)  HPV vaccine status:   Cervical cancer screening status:n/a      ROS  12 point ROS negative except where noted above    Labs from 10/20/2022 reviewed, including HgbA1c, testosterone, CBC, lipids within normal except mild hyperlpidemia with , vit D defciency borderlin Vit D 22.7. Testosterone normal. RPR reflects prior syphilis infectionExam revi  PCP note reviewed      Physical Exam  EXAM:  Blood pressure (!) 144/80, pulse 92, weight 106.2 kg (234 lb 3.2 oz).  Body mass index is 30.9 kg/m .  Prior BP readings in chart within normal limits;     Constitutional: healthy, alert, and no distress   Cardiovascular: negative, PMI normal. No lifts, heaves, or thrills. RRR. No murmurs, clicks gallops or rub  Respiratory: negative, Percussion normal. Good diaphragmatic excursion. Lungs clear  Psychiatric: mentation appears normal, affect normal/bright, and worried  : Hair in normal male distribution, penis uncircumcised with foreskin easily retractable, no lesions no discharge,   No plaque/curve. Urethral meatus normal. Testes desceneded normal size and firmness, no scrotal masses.       A/P  Erectile dysfunction  Decreased libido  History Covid infection  Anxiety  History STI's/ongoing body fluid exposures    Counseled regarding the multifactorial nature of patient's ED based on history--minimal risk factors for arterial disease (mild history dyslipidemia +/- age), consistent with element of venous leak, may be worse with anticipatory  anxiety, relationship factors, and change in liibido, change in health    Discussed treatment options including use of constriction rings, PDE-5 inhibitors, continuing lifestyle interventions and sex therapy. Given that this problem is lifelong and less with masturbation, suspect psychosocial factors predominate. Patient elects referral to sex therapy.     Counseled regarding similar factors in decreased libido, with addition of deconditinng after Covid infection, weight gain, relationship issues/desires.   Recommend send results of latest lab tests from [place] providers  Continue current lifestyle changes. Counseled on minimal standards/oversight in [place] for dietary supplements vs medications. Recommmend Consumer Lab  reviewsfor ensuring contents of supplements  Recommend sex therapy for addressing sexual communication, relationships, exploring eelemtns of libido, anxiety re: these issues    Continue PreP and safer sex practices    Follow-up as needed      I spent a total of 87 minutes on the day of the visit.   Time spent by me doing chart review, history and exam, documentation and further activities per the note

## 2023-12-04 NOTE — NURSING NOTE
Chief Complaint   Patient presents with    Eval/Assessment       BP (!) 144/80 (BP Location: Left arm, Patient Position: Sitting, Cuff Size: Adult Large)   Pulse 92   Wt 106.2 kg (234 lb 3.2 oz)   BMI 30.90 kg/m      Kailee Coker CMA  December 4, 2023

## 2023-12-15 PROBLEM — F12.90 MARIJUANA USE: Status: ACTIVE | Noted: 2022-10-18

## 2023-12-15 PROBLEM — Z86.16 HISTORY OF COVID-19: Status: ACTIVE | Noted: 2021-07-08

## 2024-06-29 ENCOUNTER — HEALTH MAINTENANCE LETTER (OUTPATIENT)
Age: 49
End: 2024-06-29

## 2025-07-13 ENCOUNTER — HEALTH MAINTENANCE LETTER (OUTPATIENT)
Age: 50
End: 2025-07-13